# Patient Record
Sex: MALE | Race: WHITE
[De-identification: names, ages, dates, MRNs, and addresses within clinical notes are randomized per-mention and may not be internally consistent; named-entity substitution may affect disease eponyms.]

---

## 2022-10-30 ENCOUNTER — HOSPITAL ENCOUNTER (EMERGENCY)
Dept: HOSPITAL 94 - ER | Age: 32
Discharge: HOME | End: 2022-10-30
Payer: MEDICAID

## 2022-10-30 VITALS — WEIGHT: 190.39 LBS | HEIGHT: 70 IN | BODY MASS INDEX: 27.26 KG/M2

## 2022-10-30 VITALS — DIASTOLIC BLOOD PRESSURE: 70 MMHG | SYSTOLIC BLOOD PRESSURE: 116 MMHG

## 2022-10-30 DIAGNOSIS — Z56.0: ICD-10-CM
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
## 2022-10-30 DIAGNOSIS — F17.210: ICD-10-CM

## 2022-10-30 DIAGNOSIS — S00.12XA: Primary | ICD-10-CM
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
## 2022-10-30 PROCEDURE — 99283 EMERGENCY DEPT VISIT LOW MDM: CPT

## 2022-10-30 SDOH — ECONOMIC STABILITY - INCOME SECURITY: UNEMPLOYMENT, UNSPECIFIED: Z56.0
